# Patient Record
Sex: MALE | Race: WHITE | HISPANIC OR LATINO | URBAN - METROPOLITAN AREA
[De-identification: names, ages, dates, MRNs, and addresses within clinical notes are randomized per-mention and may not be internally consistent; named-entity substitution may affect disease eponyms.]

---

## 2021-05-24 ENCOUNTER — EMERGENCY (EMERGENCY)
Facility: HOSPITAL | Age: 21
LOS: 0 days | Discharge: HOME | End: 2021-05-24
Attending: EMERGENCY MEDICINE | Admitting: EMERGENCY MEDICINE
Payer: MEDICAID

## 2021-05-24 VITALS
RESPIRATION RATE: 18 BRPM | WEIGHT: 212.08 LBS | TEMPERATURE: 98 F | DIASTOLIC BLOOD PRESSURE: 67 MMHG | OXYGEN SATURATION: 98 % | SYSTOLIC BLOOD PRESSURE: 135 MMHG | HEART RATE: 85 BPM

## 2021-05-24 DIAGNOSIS — K08.89 OTHER SPECIFIED DISORDERS OF TEETH AND SUPPORTING STRUCTURES: ICD-10-CM

## 2021-05-24 PROCEDURE — 99282 EMERGENCY DEPT VISIT SF MDM: CPT

## 2021-05-24 NOTE — ED PROVIDER NOTE - NS ED ROS FT
Eyes:  No visual changes, eye pain or discharge.  ENMT:  + dental pain No hearing changes, pain, discharge or infections. No neck pain or stiffness.  MS:  No myalgia, muscle weakness, joint pain or back pain.  Neuro:  No headache or weakness.  No LOC.  Skin:  No skin rash.   Endocrine: No history of thyroid disease or diabetes.  Except as documented in the HPI,  all other systems are negative.

## 2021-05-24 NOTE — CONSULT NOTE ADULT - SUBJECTIVE AND OBJECTIVE BOX
Patient is a 21y old  Male who presents with a chief complaint of Dental pain on upper left.     HPI: For a week patient has had dental pain that is worsening in the back upper left tooth.       PAST MEDICAL & SURGICAL HISTORY:  No pertinent past medical history      MEDICATIONS  (STANDING):    MEDICATIONS  (PRN):      Allergies      Intolerances      *SOCIAL HISTORY: (  + ) Tobacco; (+  ) ETOH     *Last Dental Visit: years ago.     Vital Signs Last 24 Hrs  T(C): 36.7 (24 May 2021 07:59), Max: 36.7 (24 May 2021 07:59)  T(F): 98 (24 May 2021 07:59), Max: 98 (24 May 2021 07:59)  HR: 85 (24 May 2021 07:59) (85 - 85)  BP: 135/67 (24 May 2021 07:59) (135/67 - 135/67)  BP(mean): --  RR: 18 (24 May 2021 07:59) (18 - 18)  SpO2: 98% (24 May 2021 07:59) (98% - 98%)    EOE:  TMJ ( - ) clicks                     (-  ) pops                     ( - ) crepitus             Mandible <<FROM>>             Facial bones and MOM <<grossly intact>>             ( - ) trismus             ( - ) lymphadenopathy             (  -) swelling             ( - ) asymmetry             ( - ) palpation             ( - ) dyspnea             ( - ) dysphagia             ( - ) loss of consciousness    IOE:  permanent dentition: grossly intact multiple carious teeth           hard/soft palate:  ( - ) palatal torus, <<No pathology noted>>           tongue/FOM <<No pathology noted>>           labial/buccal mucosa <<No pathology noted>>           (  + ) percussion           ( +  ) palpation           ( -  ) swelling            (  - ) abscess           (  - ) sinus tract    Dentition present: Permanent dentition  Mobility: No mobility  Caries: Caries non restorable #16 and caries #14        *DENTAL RADIOGRAPHS: 1 periapical #16.     RADIOLOGY & ADDITIONAL STUDIES:    *ASSESSMENT: Patient presents from emergency department with dental pain that has been increasing for the past week. Pain to cold and brushing his teeth.       *PLAN: Extraction #16    PROCEDURE:   Verbal and written consent given.  Anesthesia: 2 carpule 2% lidocaine with 1:[id number] epinephrine.   Treatment: All risks and benefits discussed as per OS sheet dated 7/13/00. consent obtained. side site marked. Anesthetized with 2 carpule 2% lidocaine with 1:[id number] epinephrine. Elevated and extracted #16 from socket. Curettage and irrigation. Post operative x ray taken and instructions reviewed. Hemostasis acheived. Patient dismissed.    RECOMMENDATIONS:  1) OTC pain medicine  2) Dental F/U with outpatient dentist for comprehensive dental care.   3) If any difficulty swallowing/breathing, fever occur, return to ER.       Dana Flores [id number]

## 2021-05-24 NOTE — ED PROVIDER NOTE - ATTENDING CONTRIBUTION TO CARE
21 y.o. comes in c/o 2 wk h/o dental pain x 2 weeks worsened when eating/drinking. Pt denies fever, chills, weakness, numbness. No SOB. Agree with PAs exam. Will send pt to dental clinic.

## 2021-05-24 NOTE — ED PROVIDER NOTE - OBJECTIVE STATEMENT
Pt is a 22y/o male presents today for eval of left dental pain x 2 weeks worsened when eating/drinking. Pt denies fever, chills, weakness, numbness.

## 2021-05-24 NOTE — ED PROVIDER NOTE - PHYSICAL EXAMINATION
VITAL SIGNS: I have reviewed nursing notes and confirm.  CONSTITUTIONAL: Well-developed; well-nourished; in no acute distress.   SKIN: skin exam is warm and dry, no acute rash.    HEAD: Normocephalic; atraumatic.  EYES: conjunctiva and sclera clear.  ENT: no sublingual edema, poor dentition throughout, TTP tooth 16/17 No nasal discharge; airway clear.  EXT: Normal ROM.  No clubbing, cyanosis or edema.   NEURO: Alert, oriented, grossly unremarkable    '

## 2023-05-09 ENCOUNTER — OUTPATIENT (OUTPATIENT)
Dept: OUTPATIENT SERVICES | Facility: HOSPITAL | Age: 23
LOS: 1 days | End: 2023-05-09
Payer: COMMERCIAL

## 2023-05-09 DIAGNOSIS — K02.9 DENTAL CARIES, UNSPECIFIED: ICD-10-CM

## 2023-05-09 PROBLEM — Z78.9 OTHER SPECIFIED HEALTH STATUS: Chronic | Status: ACTIVE | Noted: 2021-05-24

## 2023-05-09 PROCEDURE — D0330: CPT

## 2023-05-09 PROCEDURE — D0270: CPT

## 2023-05-09 PROCEDURE — D0140: CPT

## 2023-05-09 PROCEDURE — D0230: CPT

## 2023-05-10 DIAGNOSIS — K02.63 DENTAL CARIES ON SMOOTH SURFACE PENETRATING INTO PULP: ICD-10-CM

## 2025-06-11 NOTE — ED PROVIDER NOTE - IV ALTEPLASE DOOR HIDDEN
I spoke to Ms. Ceja.  I instructed her to keep track of all acetaminophen intake and not to exceed 4000 mg.  She says she began experiencing increased pain a few days ago with flexion.  She says she is struggling to bend the knee as far as she reports was.  I offered to see her in clinic for evaluation and possible repeat x-rays, but  she declined for now.  She would like to give this more time.  I instructed her to continue range of motion exercises at home as well as physical therapy.  If her symptoms do not seem to improve, I am happy to see her back.   show